# Patient Record
Sex: MALE | Race: WHITE | HISPANIC OR LATINO | Employment: UNEMPLOYED | URBAN - METROPOLITAN AREA
[De-identification: names, ages, dates, MRNs, and addresses within clinical notes are randomized per-mention and may not be internally consistent; named-entity substitution may affect disease eponyms.]

---

## 2024-10-13 ENCOUNTER — APPOINTMENT (OUTPATIENT)
Dept: GENERAL RADIOLOGY | Facility: HOSPITAL | Age: 29
End: 2024-10-13

## 2024-10-13 ENCOUNTER — HOSPITAL ENCOUNTER (EMERGENCY)
Facility: HOSPITAL | Age: 29
Discharge: HOME | End: 2024-10-13
Attending: STUDENT IN AN ORGANIZED HEALTH CARE EDUCATION/TRAINING PROGRAM | Admitting: STUDENT IN AN ORGANIZED HEALTH CARE EDUCATION/TRAINING PROGRAM

## 2024-10-13 VITALS
HEIGHT: 65 IN | WEIGHT: 150 LBS | TEMPERATURE: 98.7 F | RESPIRATION RATE: 18 BRPM | SYSTOLIC BLOOD PRESSURE: 147 MMHG | HEART RATE: 88 BPM | OXYGEN SATURATION: 98 % | BODY MASS INDEX: 24.99 KG/M2 | DIASTOLIC BLOOD PRESSURE: 111 MMHG

## 2024-10-13 DIAGNOSIS — S49.92XA INJURY OF LEFT CLAVICLE, INITIAL ENCOUNTER: ICD-10-CM

## 2024-10-13 DIAGNOSIS — S42.022A CLOSED DISPLACED FRACTURE OF SHAFT OF LEFT CLAVICLE, INITIAL ENCOUNTER: Primary | ICD-10-CM
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
## 2024-10-13 PROCEDURE — 73000 X-RAY EXAM OF COLLAR BONE: CPT

## 2024-10-13 PROCEDURE — 99283 EMERGENCY DEPT VISIT LOW MDM: CPT | Performed by: STUDENT IN AN ORGANIZED HEALTH CARE EDUCATION/TRAINING PROGRAM

## 2024-10-13 PROCEDURE — 71045 X-RAY EXAM CHEST 1 VIEW: CPT

## 2024-10-13 PROCEDURE — 99283 EMERGENCY DEPT VISIT LOW MDM: CPT

## 2024-10-13 RX ORDER — LIDOCAINE 50 MG/G
1 PATCH TOPICAL EVERY 24 HOURS
Qty: 15 PATCH | Refills: 0 | Status: SHIPPED | OUTPATIENT
Start: 2024-10-13 | End: 2024-10-19 | Stop reason: HOSPADM

## 2024-10-13 RX ORDER — SENNOSIDES 8.6 MG
650 CAPSULE ORAL EVERY 8 HOURS PRN
Qty: 30 TABLET | Refills: 0 | Status: SHIPPED | OUTPATIENT
Start: 2024-10-13 | End: 2024-10-19 | Stop reason: HOSPADM

## 2024-10-13 RX ORDER — ACETAMINOPHEN 500 MG
1000 TABLET ORAL ONCE
Status: COMPLETED | OUTPATIENT
Start: 2024-10-13 | End: 2024-10-13

## 2024-10-13 RX ORDER — OXYCODONE HYDROCHLORIDE 5 MG/1
5 TABLET ORAL EVERY 6 HOURS PRN
Qty: 7 TABLET | Refills: 0 | Status: SHIPPED | OUTPATIENT
Start: 2024-10-13 | End: 2024-10-19 | Stop reason: HOSPADM

## 2024-10-13 RX ORDER — LIDOCAINE 4 G/G
1 PATCH TOPICAL ONCE
Status: DISCONTINUED | OUTPATIENT
Start: 2024-10-13 | End: 2024-10-13 | Stop reason: HOSPADM

## 2024-10-13 RX ORDER — IBUPROFEN 600 MG/1
600 TABLET, FILM COATED ORAL EVERY 6 HOURS PRN
Qty: 30 TABLET | Refills: 0 | Status: SHIPPED | OUTPATIENT
Start: 2024-10-13 | End: 2024-10-19 | Stop reason: HOSPADM

## 2024-10-13 RX ORDER — OXYCODONE HYDROCHLORIDE 5 MG/1
5 TABLET ORAL ONCE
Status: COMPLETED | OUTPATIENT
Start: 2024-10-13 | End: 2024-10-13

## 2024-10-13 RX ADMIN — ACETAMINOPHEN 1000 MG: 500 TABLET ORAL at 15:22

## 2024-10-13 RX ADMIN — OXYCODONE HYDROCHLORIDE 5 MG: 5 TABLET ORAL at 15:22

## 2024-10-13 RX ADMIN — LIDOCAINE 1 PATCH: 4 PATCH TOPICAL at 15:24

## 2024-10-13 RX ADMIN — IBUPROFEN 600 MG: 200 TABLET, FILM COATED ORAL at 15:22

## 2024-10-13 NOTE — Clinical Note
JOELLE GAYTAN  [facility] EMERGENCY DEPARTMENT  [address] [place] [address]  Phone: [phone]    Wang Madden was seen and treated in our emergency department on 10/13/2024.  He may return to work on 10/17/2024.  No work until cleared by ortho doctor       Thank you for choosing [facility].    Sandeep William MD
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
## 2024-10-14 ENCOUNTER — OFFICE VISIT (OUTPATIENT)
Dept: ORTHOPEDIC SURGERY | Facility: CLINIC | Age: 29
End: 2024-10-14

## 2024-10-14 VITALS
DIASTOLIC BLOOD PRESSURE: 115 MMHG | HEIGHT: 65 IN | HEART RATE: 69 BPM | WEIGHT: 150 LBS | SYSTOLIC BLOOD PRESSURE: 157 MMHG | BODY MASS INDEX: 24.99 KG/M2

## 2024-10-14 DIAGNOSIS — S42.022A CLOSED DISPLACED FRACTURE OF SHAFT OF LEFT CLAVICLE, INITIAL ENCOUNTER: Primary | ICD-10-CM

## 2024-10-14 PROCEDURE — 99204 OFFICE O/P NEW MOD 45 MIN: CPT | Performed by: ORTHOPAEDIC SURGERY
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
## 2024-10-15 ENCOUNTER — PATIENT ROUNDING (BHMG ONLY) (OUTPATIENT)
Dept: ORTHOPEDIC SURGERY | Facility: CLINIC | Age: 29
End: 2024-10-15

## 2024-10-15 RX ORDER — PREGABALIN 150 MG/1
150 CAPSULE ORAL ONCE
Status: CANCELLED | OUTPATIENT
Start: 2024-10-15 | End: 2024-10-15

## 2024-10-15 RX ORDER — ACETAMINOPHEN 325 MG/1
1000 TABLET ORAL ONCE
Status: CANCELLED | OUTPATIENT
Start: 2024-10-15 | End: 2024-10-15

## 2024-10-15 RX ORDER — MELOXICAM 7.5 MG/1
15 TABLET ORAL ONCE
Status: CANCELLED | OUTPATIENT
Start: 2024-10-15 | End: 2024-10-15

## 2024-10-18 ENCOUNTER — APPOINTMENT (OUTPATIENT)
Dept: GENERAL RADIOLOGY | Facility: HOSPITAL | Age: 29
End: 2024-10-18
Payer: OTHER MISCELLANEOUS

## 2024-10-18 ENCOUNTER — ANESTHESIA EVENT (OUTPATIENT)
Dept: PERIOP | Facility: HOSPITAL | Age: 29
End: 2024-10-18

## 2024-10-18 ENCOUNTER — ANESTHESIA (OUTPATIENT)
Dept: PERIOP | Facility: HOSPITAL | Age: 29
End: 2024-10-18

## 2024-10-18 ENCOUNTER — HOSPITAL ENCOUNTER (OUTPATIENT)
Facility: HOSPITAL | Age: 29
Setting detail: OBSERVATION
Discharge: HOME | End: 2024-10-19
Attending: ORTHOPAEDIC SURGERY | Admitting: ORTHOPAEDIC SURGERY
Payer: OTHER MISCELLANEOUS

## 2024-10-18 DIAGNOSIS — Z98.890 STATUS POST OPEN REDUCTION AND INTERNAL FIXATION (ORIF) OF FRACTURE: Primary | ICD-10-CM

## 2024-10-18 DIAGNOSIS — Z87.81 STATUS POST OPEN REDUCTION AND INTERNAL FIXATION (ORIF) OF FRACTURE: Primary | ICD-10-CM

## 2024-10-18 DIAGNOSIS — S42.022A CLOSED DISPLACED FRACTURE OF SHAFT OF LEFT CLAVICLE, INITIAL ENCOUNTER: ICD-10-CM

## 2024-10-18 LAB
ABO GROUP BLD: NORMAL
APTT PPP: 27.3 SECONDS (ref 24.3–38.1)
BASOPHILS # BLD AUTO: 0.03 10*3/MM3 (ref 0–0.2)
BASOPHILS NFR BLD AUTO: 0.5 % (ref 0–1.5)
BLD GP AB SCN SERPL QL: NEGATIVE
DEPRECATED RDW RBC AUTO: 39.2 FL (ref 37–54)
EOSINOPHIL # BLD AUTO: 0.19 10*3/MM3 (ref 0–0.4)
EOSINOPHIL NFR BLD AUTO: 3 % (ref 0.3–6.2)
ERYTHROCYTE [DISTWIDTH] IN BLOOD BY AUTOMATED COUNT: 12.5 % (ref 12.3–15.4)
HCT VFR BLD AUTO: 45.3 % (ref 37.5–51)
HGB BLD-MCNC: 15.6 G/DL (ref 13–17.7)
IMM GRANULOCYTES # BLD AUTO: 0.01 10*3/MM3 (ref 0–0.05)
IMM GRANULOCYTES NFR BLD AUTO: 0.2 % (ref 0–0.5)
INR PPP: 0.91 (ref 0.9–1.1)
LYMPHOCYTES # BLD AUTO: 1.67 10*3/MM3 (ref 0.7–3.1)
LYMPHOCYTES NFR BLD AUTO: 26.6 % (ref 19.6–45.3)
MCH RBC QN AUTO: 29.5 PG (ref 26.6–33)
MCHC RBC AUTO-ENTMCNC: 34.4 G/DL (ref 31.5–35.7)
MCV RBC AUTO: 85.8 FL (ref 79–97)
MONOCYTES # BLD AUTO: 0.42 10*3/MM3 (ref 0.1–0.9)
MONOCYTES NFR BLD AUTO: 6.7 % (ref 5–12)
NEUTROPHILS NFR BLD AUTO: 3.95 10*3/MM3 (ref 1.7–7)
NEUTROPHILS NFR BLD AUTO: 63 % (ref 42.7–76)
NRBC BLD AUTO-RTO: 0 /100 WBC (ref 0–0.2)
PLATELET # BLD AUTO: 248 10*3/MM3 (ref 140–450)
PMV BLD AUTO: 9.9 FL (ref 6–12)
PROTHROMBIN TIME: 12.6 SECONDS (ref 12.1–15)
RBC # BLD AUTO: 5.28 10*6/MM3 (ref 4.14–5.8)
RH BLD: POSITIVE
T&S EXPIRATION DATE: NORMAL
WBC NRBC COR # BLD AUTO: 6.27 10*3/MM3 (ref 3.4–10.8)

## 2024-10-18 PROCEDURE — 25010000002 LIDOCAINE 2% SOLUTION: Performed by: NURSE ANESTHETIST, CERTIFIED REGISTERED

## 2024-10-18 PROCEDURE — 85730 THROMBOPLASTIN TIME PARTIAL: CPT | Performed by: ORTHOPAEDIC SURGERY

## 2024-10-18 PROCEDURE — 25010000002 CEFAZOLIN PER 500 MG: Performed by: ORTHOPAEDIC SURGERY

## 2024-10-18 PROCEDURE — C1713 ANCHOR/SCREW BN/BN,TIS/BN: HCPCS | Performed by: ORTHOPAEDIC SURGERY

## 2024-10-18 PROCEDURE — 86900 BLOOD TYPING SEROLOGIC ABO: CPT | Performed by: ORTHOPAEDIC SURGERY

## 2024-10-18 PROCEDURE — 86901 BLOOD TYPING SEROLOGIC RH(D): CPT | Performed by: ORTHOPAEDIC SURGERY

## 2024-10-18 PROCEDURE — 25010000002 MIDAZOLAM PER 1MG: Performed by: ANESTHESIOLOGY

## 2024-10-18 PROCEDURE — G0378 HOSPITAL OBSERVATION PER HR: HCPCS

## 2024-10-18 PROCEDURE — 25010000002 PROPOFOL 200 MG/20ML EMULSION: Performed by: NURSE ANESTHETIST, CERTIFIED REGISTERED

## 2024-10-18 PROCEDURE — 94761 N-INVAS EAR/PLS OXIMETRY MLT: CPT

## 2024-10-18 PROCEDURE — 25010000002 BUPIVACAINE (PF) 0.5 % SOLUTION: Performed by: NURSE ANESTHETIST, CERTIFIED REGISTERED

## 2024-10-18 PROCEDURE — 85610 PROTHROMBIN TIME: CPT | Performed by: ORTHOPAEDIC SURGERY

## 2024-10-18 PROCEDURE — 25010000002 SUGAMMADEX 200 MG/2ML SOLUTION: Performed by: NURSE ANESTHETIST, CERTIFIED REGISTERED

## 2024-10-18 PROCEDURE — 23515 OPTX CLAVICULAR FX W/INT FIX: CPT | Performed by: ORTHOPAEDIC SURGERY

## 2024-10-18 PROCEDURE — 25010000002 DEXAMETHASONE PER 1 MG: Performed by: ANESTHESIOLOGY

## 2024-10-18 PROCEDURE — 76000 FLUOROSCOPY <1 HR PHYS/QHP: CPT

## 2024-10-18 PROCEDURE — 25810000003 LACTATED RINGERS PER 1000 ML: Performed by: ANESTHESIOLOGY

## 2024-10-18 PROCEDURE — 23515 OPTX CLAVICULAR FX W/INT FIX: CPT | Performed by: SPECIALIST/TECHNOLOGIST, OTHER

## 2024-10-18 PROCEDURE — 85025 COMPLETE CBC W/AUTO DIFF WBC: CPT | Performed by: ORTHOPAEDIC SURGERY

## 2024-10-18 PROCEDURE — 86850 RBC ANTIBODY SCREEN: CPT | Performed by: ORTHOPAEDIC SURGERY

## 2024-10-18 PROCEDURE — 25010000002 ONDANSETRON PER 1 MG: Performed by: ANESTHESIOLOGY

## 2024-10-18 PROCEDURE — 73000 X-RAY EXAM OF COLLAR BONE: CPT

## 2024-10-18 DEVICE — SCRW LK S/TAP T8STRDRV 2.7X14MM: Type: IMPLANTABLE DEVICE | Site: ARM | Status: FUNCTIONAL

## 2024-10-18 DEVICE — KNOTLESS TISSUE CONTROL DEVICE, UNDYED UNIDIRECTIONAL (ANTIBACTERIAL) SYNTHETIC ABSORBABLE DEVICE
Type: IMPLANTABLE DEVICE | Site: ARM | Status: FUNCTIONAL
Brand: STRATAFIX

## 2024-10-18 DEVICE — MEDLINE BONEWAX YELLOW
Type: IMPLANTABLE DEVICE | Site: ARM | Status: FUNCTIONAL
Brand: MEDLINE BONEWAX YELLOW

## 2024-10-18 DEVICE — SCRW CORT S/TAP STRDRV 2.7X14MM: Type: IMPLANTABLE DEVICE | Site: ARM | Status: FUNCTIONAL

## 2024-10-18 DEVICE — OPTIUM DBM PUTTY
Type: IMPLANTABLE DEVICE | Site: ARM | Status: FUNCTIONAL
Brand: OPTIUM®

## 2024-10-18 DEVICE — SCRW LK S/TAP T8STRDRV 2.7X12MM: Type: IMPLANTABLE DEVICE | Site: ARM | Status: FUNCTIONAL

## 2024-10-18 DEVICE — SCRW CORT S/TAP STRDRV 2.7X12MM: Type: IMPLANTABLE DEVICE | Site: ARM | Status: FUNCTIONAL

## 2024-10-18 DEVICE — SCRW LK S/TAP T8STRDRV 2.7X16MM: Type: IMPLANTABLE DEVICE | Site: ARM | Status: FUNCTIONAL

## 2024-10-18 DEVICE — IMPLANTABLE DEVICE: Type: IMPLANTABLE DEVICE | Site: ARM | Status: FUNCTIONAL

## 2024-10-18 RX ORDER — HYDROCODONE BITARTRATE AND ACETAMINOPHEN 5; 325 MG/1; MG/1
1 TABLET ORAL ONCE AS NEEDED
Status: DISCONTINUED | OUTPATIENT
Start: 2024-10-18 | End: 2024-10-18 | Stop reason: HOSPADM

## 2024-10-18 RX ORDER — NALOXONE HCL 0.4 MG/ML
0.4 VIAL (ML) INJECTION
Status: DISCONTINUED | OUTPATIENT
Start: 2024-10-18 | End: 2024-10-19 | Stop reason: HOSPADM

## 2024-10-18 RX ORDER — TRANEXAMIC ACID 10 MG/ML
1000 INJECTION, SOLUTION INTRAVENOUS ONCE
Status: DISCONTINUED | OUTPATIENT
Start: 2024-10-18 | End: 2024-10-18 | Stop reason: HOSPADM

## 2024-10-18 RX ORDER — MELOXICAM 7.5 MG/1
15 TABLET ORAL ONCE
Status: DISCONTINUED | OUTPATIENT
Start: 2024-10-18 | End: 2024-10-18 | Stop reason: SDUPTHER

## 2024-10-18 RX ORDER — BUPIVACAINE HYDROCHLORIDE 5 MG/ML
INJECTION, SOLUTION EPIDURAL; INTRACAUDAL
Status: COMPLETED | OUTPATIENT
Start: 2024-10-18 | End: 2024-10-18

## 2024-10-18 RX ORDER — DEXMEDETOMIDINE HYDROCHLORIDE 100 UG/ML
INJECTION, SOLUTION INTRAVENOUS
Status: COMPLETED | OUTPATIENT
Start: 2024-10-18 | End: 2024-10-18

## 2024-10-18 RX ORDER — PREGABALIN 75 MG/1
150 CAPSULE ORAL ONCE
Status: COMPLETED | OUTPATIENT
Start: 2024-10-18 | End: 2024-10-18

## 2024-10-18 RX ORDER — TRANEXAMIC ACID 10 MG/ML
1000 INJECTION, SOLUTION INTRAVENOUS ONCE
Status: COMPLETED | OUTPATIENT
Start: 2024-10-18 | End: 2024-10-18

## 2024-10-18 RX ORDER — ONDANSETRON 2 MG/ML
4 INJECTION INTRAMUSCULAR; INTRAVENOUS EVERY 6 HOURS PRN
Status: DISCONTINUED | OUTPATIENT
Start: 2024-10-18 | End: 2024-10-19 | Stop reason: HOSPADM

## 2024-10-18 RX ORDER — SODIUM CHLORIDE, SODIUM LACTATE, POTASSIUM CHLORIDE, CALCIUM CHLORIDE 600; 310; 30; 20 MG/100ML; MG/100ML; MG/100ML; MG/100ML
9 INJECTION, SOLUTION INTRAVENOUS CONTINUOUS
Status: DISCONTINUED | OUTPATIENT
Start: 2024-10-18 | End: 2024-10-18

## 2024-10-18 RX ORDER — MELOXICAM 7.5 MG/1
15 TABLET ORAL ONCE
Status: COMPLETED | OUTPATIENT
Start: 2024-10-18 | End: 2024-10-18

## 2024-10-18 RX ORDER — FENTANYL CITRATE 50 UG/ML
50 INJECTION, SOLUTION INTRAMUSCULAR; INTRAVENOUS
Status: DISCONTINUED | OUTPATIENT
Start: 2024-10-18 | End: 2024-10-18 | Stop reason: HOSPADM

## 2024-10-18 RX ORDER — DEXAMETHASONE SODIUM PHOSPHATE 10 MG/ML
8 INJECTION INTRAMUSCULAR; INTRAVENOUS ONCE AS NEEDED
Status: COMPLETED | OUTPATIENT
Start: 2024-10-18 | End: 2024-10-18

## 2024-10-18 RX ORDER — SODIUM CHLORIDE 0.9 % (FLUSH) 0.9 %
10 SYRINGE (ML) INJECTION AS NEEDED
Status: DISCONTINUED | OUTPATIENT
Start: 2024-10-18 | End: 2024-10-19 | Stop reason: HOSPADM

## 2024-10-18 RX ORDER — OXYCODONE AND ACETAMINOPHEN 10; 325 MG/1; MG/1
1 TABLET ORAL EVERY 4 HOURS PRN
Status: DISCONTINUED | OUTPATIENT
Start: 2024-10-18 | End: 2024-10-19 | Stop reason: HOSPADM

## 2024-10-18 RX ORDER — PREGABALIN 75 MG/1
150 CAPSULE ORAL ONCE
Status: DISCONTINUED | OUTPATIENT
Start: 2024-10-18 | End: 2024-10-18 | Stop reason: SDUPTHER

## 2024-10-18 RX ORDER — SODIUM CHLORIDE 9 MG/ML
40 INJECTION, SOLUTION INTRAVENOUS AS NEEDED
Status: ACTIVE | OUTPATIENT
Start: 2024-10-18 | End: 2024-10-18

## 2024-10-18 RX ORDER — ONDANSETRON 2 MG/ML
4 INJECTION INTRAMUSCULAR; INTRAVENOUS ONCE AS NEEDED
Status: DISCONTINUED | OUTPATIENT
Start: 2024-10-18 | End: 2024-10-18 | Stop reason: HOSPADM

## 2024-10-18 RX ORDER — ACETAMINOPHEN 500 MG
1000 TABLET ORAL ONCE
Status: DISCONTINUED | OUTPATIENT
Start: 2024-10-18 | End: 2024-10-18 | Stop reason: SDUPTHER

## 2024-10-18 RX ORDER — SODIUM CHLORIDE, SODIUM LACTATE, POTASSIUM CHLORIDE, CALCIUM CHLORIDE 600; 310; 30; 20 MG/100ML; MG/100ML; MG/100ML; MG/100ML
100 INJECTION, SOLUTION INTRAVENOUS ONCE
Status: DISCONTINUED | OUTPATIENT
Start: 2024-10-18 | End: 2024-10-18 | Stop reason: HOSPADM

## 2024-10-18 RX ORDER — ACETAMINOPHEN 500 MG
1000 TABLET ORAL ONCE
Status: COMPLETED | OUTPATIENT
Start: 2024-10-18 | End: 2024-10-18

## 2024-10-18 RX ORDER — MORPHINE SULFATE 2 MG/ML
1 INJECTION, SOLUTION INTRAMUSCULAR; INTRAVENOUS EVERY 4 HOURS PRN
Status: DISCONTINUED | OUTPATIENT
Start: 2024-10-18 | End: 2024-10-19 | Stop reason: HOSPADM

## 2024-10-18 RX ORDER — MIDAZOLAM HYDROCHLORIDE 2 MG/2ML
1 INJECTION, SOLUTION INTRAMUSCULAR; INTRAVENOUS
Status: DISCONTINUED | OUTPATIENT
Start: 2024-10-18 | End: 2024-10-18 | Stop reason: HOSPADM

## 2024-10-18 RX ORDER — SODIUM CHLORIDE 0.9 % (FLUSH) 0.9 %
10 SYRINGE (ML) INJECTION EVERY 12 HOURS SCHEDULED
Status: DISCONTINUED | OUTPATIENT
Start: 2024-10-18 | End: 2024-10-19 | Stop reason: HOSPADM

## 2024-10-18 RX ORDER — LIDOCAINE HYDROCHLORIDE 20 MG/ML
INJECTION, SOLUTION INFILTRATION; PERINEURAL AS NEEDED
Status: DISCONTINUED | OUTPATIENT
Start: 2024-10-18 | End: 2024-10-18 | Stop reason: SURG

## 2024-10-18 RX ORDER — MAGNESIUM HYDROXIDE 1200 MG/15ML
LIQUID ORAL AS NEEDED
Status: DISCONTINUED | OUTPATIENT
Start: 2024-10-18 | End: 2024-10-18 | Stop reason: HOSPADM

## 2024-10-18 RX ORDER — ONDANSETRON 4 MG/1
4 TABLET, ORALLY DISINTEGRATING ORAL EVERY 6 HOURS PRN
Status: DISCONTINUED | OUTPATIENT
Start: 2024-10-18 | End: 2024-10-19 | Stop reason: HOSPADM

## 2024-10-18 RX ORDER — PROPOFOL 10 MG/ML
INJECTION, EMULSION INTRAVENOUS AS NEEDED
Status: DISCONTINUED | OUTPATIENT
Start: 2024-10-18 | End: 2024-10-18 | Stop reason: SURG

## 2024-10-18 RX ORDER — ROCURONIUM BROMIDE 10 MG/ML
INJECTION, SOLUTION INTRAVENOUS AS NEEDED
Status: DISCONTINUED | OUTPATIENT
Start: 2024-10-18 | End: 2024-10-18 | Stop reason: SURG

## 2024-10-18 RX ORDER — OXYCODONE AND ACETAMINOPHEN 5; 325 MG/1; MG/1
1 TABLET ORAL EVERY 4 HOURS PRN
Status: DISCONTINUED | OUTPATIENT
Start: 2024-10-18 | End: 2024-10-19 | Stop reason: HOSPADM

## 2024-10-18 RX ORDER — ONDANSETRON 2 MG/ML
4 INJECTION INTRAMUSCULAR; INTRAVENOUS ONCE AS NEEDED
Status: COMPLETED | OUTPATIENT
Start: 2024-10-18 | End: 2024-10-18

## 2024-10-18 RX ORDER — KETAMINE HYDROCHLORIDE 10 MG/ML
INJECTION, SOLUTION INTRAMUSCULAR; INTRAVENOUS AS NEEDED
Status: DISCONTINUED | OUTPATIENT
Start: 2024-10-18 | End: 2024-10-18 | Stop reason: SURG

## 2024-10-18 RX ORDER — FAMOTIDINE 10 MG/ML
20 INJECTION, SOLUTION INTRAVENOUS
Status: COMPLETED | OUTPATIENT
Start: 2024-10-18 | End: 2024-10-18

## 2024-10-18 RX ADMIN — PROPOFOL 150 MG: 10 INJECTION, EMULSION INTRAVENOUS at 15:00

## 2024-10-18 RX ADMIN — KETAMINE HYDROCHLORIDE 30 MG: 10 INJECTION INTRAMUSCULAR; INTRAVENOUS at 15:00

## 2024-10-18 RX ADMIN — ROCURONIUM 10 MG: 50 INJECTION, SOLUTION INTRAVENOUS at 16:21

## 2024-10-18 RX ADMIN — BUPIVACAINE HYDROCHLORIDE 15 ML: 5 INJECTION, SOLUTION EPIDURAL; INTRACAUDAL; PERINEURAL at 14:25

## 2024-10-18 RX ADMIN — SODIUM CHLORIDE, POTASSIUM CHLORIDE, SODIUM LACTATE AND CALCIUM CHLORIDE: 600; 310; 30; 20 INJECTION, SOLUTION INTRAVENOUS at 14:49

## 2024-10-18 RX ADMIN — TRANEXAMIC ACID 1000 MG: 10 INJECTION, SOLUTION INTRAVENOUS at 15:11

## 2024-10-18 RX ADMIN — ACETAMINOPHEN 1000 MG: 500 TABLET ORAL at 14:02

## 2024-10-18 RX ADMIN — DEXMEDETOMIDINE 15 MCG: 100 INJECTION, SOLUTION, CONCENTRATE INTRAVENOUS at 14:25

## 2024-10-18 RX ADMIN — ROCURONIUM 50 MG: 50 INJECTION, SOLUTION INTRAVENOUS at 15:01

## 2024-10-18 RX ADMIN — LIDOCAINE HYDROCHLORIDE 80 MG: 20 INJECTION, SOLUTION INFILTRATION; PERINEURAL at 15:00

## 2024-10-18 RX ADMIN — DEXAMETHASONE SODIUM PHOSPHATE 8 MG: 10 INJECTION INTRAMUSCULAR; INTRAVENOUS at 14:02

## 2024-10-18 RX ADMIN — CEFAZOLIN 2000 MG: 2 INJECTION, POWDER, FOR SOLUTION INTRAVENOUS at 15:09

## 2024-10-18 RX ADMIN — SUGAMMADEX 148 MG: 100 INJECTION, SOLUTION INTRAVENOUS at 17:07

## 2024-10-18 RX ADMIN — BUPIVACAINE HYDROCHLORIDE 5 ML: 5 INJECTION, SOLUTION EPIDURAL; INTRACAUDAL at 14:31

## 2024-10-18 RX ADMIN — ROCURONIUM 10 MG: 50 INJECTION, SOLUTION INTRAVENOUS at 15:39

## 2024-10-18 RX ADMIN — PREGABALIN 150 MG: 75 CAPSULE ORAL at 14:02

## 2024-10-18 RX ADMIN — Medication 10 ML: at 21:29

## 2024-10-18 RX ADMIN — FAMOTIDINE 20 MG: 10 INJECTION, SOLUTION INTRAVENOUS at 14:01

## 2024-10-18 RX ADMIN — OXYCODONE HYDROCHLORIDE AND ACETAMINOPHEN 1 TABLET: 5; 325 TABLET ORAL at 21:30

## 2024-10-18 RX ADMIN — DEXMEDETOMIDINE HYDROCHLORIDE 5 MCG: 100 INJECTION, SOLUTION INTRAVENOUS at 14:31

## 2024-10-18 RX ADMIN — MIDAZOLAM HYDROCHLORIDE 1 MG: 1 INJECTION, SOLUTION INTRAMUSCULAR; INTRAVENOUS at 14:05

## 2024-10-18 RX ADMIN — TRANEXAMIC ACID 1000 MG: 10 INJECTION, SOLUTION INTRAVENOUS at 17:00

## 2024-10-18 RX ADMIN — ONDANSETRON 4 MG: 2 INJECTION INTRAMUSCULAR; INTRAVENOUS at 14:01

## 2024-10-18 RX ADMIN — MELOXICAM 15 MG: 7.5 TABLET ORAL at 14:02

## 2024-10-18 NOTE — OP NOTE
Date of Operation: 10/18/2024     PREOPERATIVE DIAGNOSIS: Left comminuted clavicle fracture.       POSTOPERATIVE DIAGNOSIS: Left comminuted clavicle fracture.        PROCEDURE PERFORMED: Open reduction, internal fixation of left clavicle fracture.       SURGEON: Luis Gaines MD     ASSISTANT:     Assistant: Liam Eddy CSA was responsible for performing the following activities: Retraction, Irrigation, Closing, and Placing Dressing and their skilled assistance was necessary for the success of this case.     ANESTHESIA: general with block       ESTIMATED BLOOD LOSS: 50 mL       URINE OUTPUT: Not recorded.       FLUIDS: Per anesthesia.       COMPLICATIONS: None.       SPECIMENS: None.       DRAINS: None.       IMPLANTS: Synthes superior left clavicle shaft plate with locking screw x 7, cortical screw x 3 with 1 mL of demineralized bone matrix.       INDICATIONS FOR PROCEDURE: The patient is a pleasant 29 y.o. male with significant history of left clavicle fracture. I discussed treatment options available to the patient including closed treatment versus open reduction, internal fixation, and patient wished to proceed with surgical treatment. Given the comminuted nature of the fracture site, as well as concerns for nonunion. I explained details of the procedure, as well as the risks, benefits, and alternatives as documented on history and physical, and the patient had all questions answered prior to signing the operative consent form. No guarantees were given in regard to results of the surgery.       DESCRIPTION OF PROCEDURE: The patient was seen, evaluated, and cleared for surgery by anesthesia. Admitted in the preoperative holding area. The operative site was marked, consent was reviewed, history and physical was updated, and preoperative labs were reviewed. A regional block was then placed per anesthesia. The patient was then taken to the operating room and placed in a supine position on a beach chair table.  After successful intubation per anesthesia, the patient was elevated up into a semi-seated position. Head was secured to table with a facemask. All bony prominences were well padded. The neck was in normal anatomic alignment. The left upper extremity was then sterilely prepped and draped in a standard fashion.       A formal timeout was completed, including confirmation of History and Physical, operative consent, surgical site, patient identification number, and preoperative antibiotic administration. The procedure was then begun with an 8 cm incision over the anterior aspect of the left shoulder just inferior to the clavicular margin through the skin with a #15 blade. Careful dissection was carried through the subcutaneous tissue with Metzenbaum scissors until the periosteal layer of the clavicle was identified at this point in time.  Fracture site was identified at this point time and tentative reduction obtained. A superior shaft plate was chosen and applied to the superior surface of the clavicle at this time. One cortical screw was placed in bicortical fashion medial to the fracture site. The plate was clamped to the lateral clavicle, reduction and length of clavicle were assessed and noted to be acceptable at this time. Thus, cortical screws were placed lateral to the fracture site in bicortical fashion as well. Additional locking screws were placed both medial and lateral to the fracture site at this point in time.  Reduction and stability at the fracture site were once again assessed under direct visualization as well as under stress with gentle range of motion of shoulder. Final fluoroscopic images were taken at this point in time, confirming acceptable reduction and placement of hardware.      Attention was then turned to thorough irrigation with Betadine lavage followed by normal saline.  DBM graft was applied to the comminuted section of the fracture at this time followed by replacement of one of the  comminuted pieces that was too small for screw fixation.  This was then sutured into place with 2-0 Vicryl x 2.  Good fixation noted.     Attention was then turned to closure of the wound with 2-0 Vicryl for subcutaneous closure, 3-0 effects for skin closure with mesh and glue for skin glue. The wounds were dressed with Telfa, 4 x 4 gauze, Tegaderm, ABD pad, and Medipore tape. The patient was placed in a sling with an abduction pillow to left side.       At the end of the procedure, all lap, needle, and sponge counts were correct x2. The patient had brisk capillary refill to all digits of the left upper extremity. Compartments were soft and easily compressible at the end of the procedure.       DISPOSITION: The patient was extubated per anesthesia and taken to the recovery room in stable condition. Patient will be discharged home in the care of family, follow up in 1 week for wound check. Continue in the sling at all times until followup. All questions were answered postoperatively.

## 2024-10-18 NOTE — ANESTHESIA POSTPROCEDURE EVALUATION
Patient: Wang Madden    Procedure Summary       Date: 10/18/24 Room / Location:  [facility] OR 3 /  [facility] OR    Anesthesia Start: 1452 Anesthesia Stop: 1717    Procedure: Open reduction internal fixation clavicle fracture and all associated procedures (Left: Arm Upper) Diagnosis:       Closed displaced fracture of shaft of left clavicle, initial encounter      (Closed displaced fracture of shaft of left clavicle, initial encounter [S42.022A])    Surgeons: Luis Gaines MD Provider: Tabitha De Oliveira CRNA    Anesthesia Type: general with block ASA Status: 1            Anesthesia Type: general with block    Vitals  Vitals Value Taken Time   /85 10/18/24 1805   Temp 97.6 °F (36.4 °C) 10/18/24 1740   Pulse 89 10/18/24 1812   Resp 18 10/18/24 1750   SpO2 96 % 10/18/24 1812   Vitals shown include unfiled device data.        Post Anesthesia Care and Evaluation    Patient location during evaluation: PACU  Patient participation: complete - patient participated  Level of consciousness: awake and alert  Pain score: 2  Pain management: satisfactory to patient    Airway patency: patent  Anesthetic complications: No anesthetic complications  PONV Status: none  Cardiovascular status: acceptable  Respiratory status: acceptable  Hydration status: acceptable

## 2024-10-18 NOTE — ANESTHESIA PROCEDURE NOTES
Peripheral Block    Pre-sedation assessment completed: 10/18/2024 2:07 PM    Patient reassessed immediately prior to procedure    Patient location during procedure: pre-op  Start time: 10/18/2024 2:29 PM  Stop time: 10/18/2024 2:31 PM  Reason for block: at surgeon's request and post-op pain management  Performed by  CRNA/CAA: Tabitha De Oliveira CRNASRNA: Gregory Thorpe SRNA  Preanesthetic Checklist  Completed: patient identified, IV checked, site marked, risks and benefits discussed, surgical consent, monitors and equipment checked, pre-op evaluation and timeout performed  Prep:  Pt Position: supine  Sterile barriers:cap, gloves, mask and washed/disinfected hands  Prep: ChloraPrep  Patient monitoring: blood pressure monitoring, continuous pulse oximetry and EKG  Procedure    Sedation: yes  Performed under: local infiltration  Guidance:ultrasound guided    ULTRASOUND INTERPRETATION.  Using ultrasound guidance a 21 G gauge needle was placed in close proximity to the nerve, at which point, under ultrasound guidance anesthetic was injected in the area of the nerve and spread of the anesthesia was seen on ultrasound in close proximity thereto.  There were no abnormalities seen on ultrasound; a digital image was taken; and the patient tolerated the procedure with no complications. Images:still images obtained, printed/placed on chart    Laterality:left  Block Type:superficial cervical plexus  Injection Technique:single-shot  Needle Type:echogenic  Needle Gauge:21 G  Resistance on Injection: none    Medications Used: dexmedetomidine HCl (PRECEDEX) injection - Perineural   5 mcg - 10/18/2024 2:31:00 PM  bupivacaine PF (MARCAINE) injection 0.5% - Perineural   5 mL - 10/18/2024 2:31:00 PM      Post Assessment  Injection Assessment: negative aspiration for heme, no paresthesia on injection and incremental injection  Patient Tolerance:comfortable throughout block  Complications:no  Performed by: Gregory Thorpe,  SRNA

## 2024-10-18 NOTE — ANESTHESIA PREPROCEDURE EVALUATION
Anesthesia Evaluation     Patient summary reviewed and Nursing notes reviewed   no history of anesthetic complications:   NPO Solid Status: > 8 hours  NPO Liquid Status: > 8 hours           Airway   Mallampati: II  TM distance: >3 FB  Neck ROM: full  No difficulty expected  Dental - normal exam     Pulmonary - negative pulmonary ROS and normal exam   Cardiovascular - negative cardio ROS and normal exam  Exercise tolerance: good (4-7 METS)        Neuro/Psych  GI/Hepatic/Renal/Endo - negative ROS     Musculoskeletal (-) negative ROS    Abdominal  - normal exam   Substance History - negative use     OB/GYN          Other - negative ROS       ROS/Med Hx Other:  used for entire pre-operative assessment including education on procedure, risks/benefits, etc.  also used during block placement.                  Anesthesia Plan    ASA 1     general with block     (Consents to ISB and superficial cervical plexus block (via  used). )  intravenous induction     Anesthetic plan, risks, benefits, and alternatives have been provided, discussed and informed consent has been obtained with: patient.  Pre-procedure education provided  Use of blood products discussed with patient  Consented to blood products.    Plan discussed with CRNA.      CODE STATUS:
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
## 2024-10-18 NOTE — CASE MANAGEMENT/SOCIAL WORK
Continued Stay Note  REJI Cortes     Patient Name: Wang Madden  MRN: [id number]  Today's Date: 10/18/2024    Admit Date: 10/18/2024    Plan: Home with his sister.   Discharge Plan       Row Name 10/18/24 0944       Plan    Plan Home with his sister.    Plan Comments Met with pt and his sister, pt agreed to talk in front of his sister.  Used [employer] intrepreter # [id number]. He confirmed his address. pt lives with is sister in a 1 story home. there are 3-4 steps to get into the front door.  He is IADL's and uses no DME.  He works full time and already has a work excuse.  He says his sister can manage finances until he can use his left arm again.  He uses [email or web address] in [place] and has no insurance.  I gave him a Good Rx card to help.  He also has no pcp.  I gave him information on [facility] clinic here in [place].  His plan is to return home with his sister.  I sent referral to [facility] to see if he qualifies for medicaid. Will follow. Thanks, Melisa SANCHEZ                   Discharge Codes    No documentation.                       Melisa Beltran

## 2024-10-18 NOTE — ANESTHESIA PROCEDURE NOTES
Peripheral Block    Pre-sedation assessment completed: 10/18/2024 2:07 PM    Patient reassessed immediately prior to procedure    Patient location during procedure: pre-op  Start time: 10/18/2024 2:18 PM  Stop time: 10/18/2024 2:25 PM  Reason for block: at surgeon's request and post-op pain management  Performed by  CRNA/CAA: Tabitha De Oliveira CRNASRNA: Gregory Thorpe SRNA  Preanesthetic Checklist  Completed: patient identified, IV checked, site marked, risks and benefits discussed, surgical consent, monitors and equipment checked, pre-op evaluation and timeout performed  Prep:  Pt Position: supine  Sterile barriers:cap, gloves, mask and washed/disinfected hands  Prep: ChloraPrep  Patient monitoring: blood pressure monitoring, continuous pulse oximetry and EKG  Procedure    Sedation: yes  Performed under: local infiltration  Guidance:ultrasound guided    ULTRASOUND INTERPRETATION.  Using ultrasound guidance a 21 G gauge needle was placed in close proximity to the nerve, at which point, under ultrasound guidance anesthetic was injected in the area of the nerve and spread of the anesthesia was seen on ultrasound in close proximity thereto.  There were no abnormalities seen on ultrasound; a digital image was taken; and the patient tolerated the procedure with no complications. Images:still images obtained, printed/placed on chart    Laterality:left  Block Type:interscalene  Injection Technique:single-shot  Needle Type:echogenic  Needle Gauge:21 G  Resistance on Injection: none    Medications Used: bupivacaine PF (MARCAINE) injection 0.5% - Perineural   15 mL - 10/18/2024 2:25:00 PM  dexmedetomidine HCl (PRECEDEX) injection - Perineural   15 mcg - 10/18/2024 2:25:00 PM      Post Assessment  Injection Assessment: negative aspiration for heme, no paresthesia on injection and incremental injection  Patient Tolerance:comfortable throughout block  Complications:no  Performed by: Gregory Thorpe, SRNA

## 2024-10-18 NOTE — ANESTHESIA PROCEDURE NOTES
Airway  Urgency: elective    Date/Time: 10/18/2024 3:04 PM  Airway not difficult    General Information and Staff    Patient location during procedure: OR  CRNA/CAA: Tabitha De Oliveira CRNA    Indications and Patient Condition  Indications for airway management: airway protection    Preoxygenated: yes  Mask difficulty assessment: 1 - vent by mask    Final Airway Details  Final airway type: endotracheal airway      Successful airway: ETT  Cuffed: yes   Successful intubation technique: direct laryngoscopy  Facilitating devices/methods: intubating stylet  Endotracheal tube insertion site: oral  Blade: Princess  Blade size: 3.5  ETT size (mm): 7.0  Cormack-Lehane Classification: grade I - full view of glottis  Placement verified by: chest auscultation and capnometry   Cuff volume (mL): 8  Measured from: lips  ETT/EBT  to lips (cm): 21  Number of attempts at approach: 1  Assessment: lips, teeth, and gum same as pre-op and atraumatic intubation

## 2024-10-19 VITALS
TEMPERATURE: 97.9 F | HEIGHT: 65 IN | WEIGHT: 163 LBS | OXYGEN SATURATION: 94 % | RESPIRATION RATE: 20 BRPM | BODY MASS INDEX: 27.16 KG/M2 | DIASTOLIC BLOOD PRESSURE: 77 MMHG | SYSTOLIC BLOOD PRESSURE: 136 MMHG | HEART RATE: 84 BPM

## 2024-10-19 LAB
ANION GAP SERPL CALCULATED.3IONS-SCNC: 12.9 MMOL/L (ref 5–15)
BUN SERPL-MCNC: 19 MG/DL (ref 6–20)
BUN/CREAT SERPL: 20.9 (ref 7–25)
CALCIUM SPEC-SCNC: 9.1 MG/DL (ref 8.6–10.5)
CHLORIDE SERPL-SCNC: 103 MMOL/L (ref 98–107)
CO2 SERPL-SCNC: 20.1 MMOL/L (ref 22–29)
CREAT SERPL-MCNC: 0.91 MG/DL (ref 0.76–1.27)
EGFRCR SERPLBLD CKD-EPI 2021: 116.3 ML/MIN/1.73
GLUCOSE SERPL-MCNC: 166 MG/DL (ref 65–99)
HCT VFR BLD AUTO: 45.7 % (ref 37.5–51)
HGB BLD-MCNC: 15.7 G/DL (ref 13–17.7)
POTASSIUM SERPL-SCNC: 4 MMOL/L (ref 3.5–5.2)
SODIUM SERPL-SCNC: 136 MMOL/L (ref 136–145)

## 2024-10-19 PROCEDURE — 80048 BASIC METABOLIC PNL TOTAL CA: CPT | Performed by: ORTHOPAEDIC SURGERY

## 2024-10-19 PROCEDURE — G0378 HOSPITAL OBSERVATION PER HR: HCPCS

## 2024-10-19 PROCEDURE — 97161 PT EVAL LOW COMPLEX 20 MIN: CPT

## 2024-10-19 PROCEDURE — 85018 HEMOGLOBIN: CPT | Performed by: ORTHOPAEDIC SURGERY

## 2024-10-19 PROCEDURE — 97165 OT EVAL LOW COMPLEX 30 MIN: CPT

## 2024-10-19 PROCEDURE — 94799 UNLISTED PULMONARY SVC/PX: CPT

## 2024-10-19 PROCEDURE — 25010000002 CEFAZOLIN PER 500 MG: Performed by: ORTHOPAEDIC SURGERY

## 2024-10-19 PROCEDURE — 85014 HEMATOCRIT: CPT | Performed by: ORTHOPAEDIC SURGERY

## 2024-10-19 RX ORDER — ONDANSETRON 4 MG/1
4 TABLET, ORALLY DISINTEGRATING ORAL EVERY 8 HOURS PRN
Qty: 20 TABLET | Refills: 0 | Status: SHIPPED | OUTPATIENT
Start: 2024-10-19

## 2024-10-19 RX ORDER — OXYCODONE AND ACETAMINOPHEN 7.5; 325 MG/1; MG/1
1 TABLET ORAL EVERY 4 HOURS PRN
Qty: 30 TABLET | Refills: 0 | Status: SHIPPED | OUTPATIENT
Start: 2024-10-19

## 2024-10-19 RX ORDER — DOCUSATE SODIUM 100 MG/1
100 CAPSULE, LIQUID FILLED ORAL 2 TIMES DAILY PRN
Qty: 60 CAPSULE | Refills: 0 | Status: SHIPPED | OUTPATIENT
Start: 2024-10-19

## 2024-10-19 RX ADMIN — CEFAZOLIN 2000 MG: 2 INJECTION, POWDER, FOR SOLUTION INTRAVENOUS at 00:00

## 2024-10-19 RX ADMIN — CEFAZOLIN 2000 MG: 2 INJECTION, POWDER, FOR SOLUTION INTRAVENOUS at 06:25

## 2024-10-19 NOTE — PROGRESS NOTES
POD# 1 s/p ORIF left clavicle fracture    Subjective:Wang Madden resting in bed this a.m.,  utilized this a.m.  He reports overnight he is done well does continue to experience some paresthesia into the hand including palmar and dorsum of the hand and all digits.  Pain controlled overnight.  Denies any issues with his incision overnight.      Objective:  Vitals:    10/18/24 2030 10/18/24 2354 10/19/24 0404 10/19/24 0732   BP:  127/63 116/67 136/77   BP Location:  Right arm Right arm Right arm   Patient Position:  Lying Lying Lying   Pulse: 97 109 92 89   Resp: 20 18 18 18   Temp:  97 °F (36.1 °C) 97 °F (36.1 °C) 97.9 °F (36.6 °C)   TempSrc:  Temporal Temporal Tympanic   SpO2: 93% 94% 95% 94%   Weight:       Height:           Results from last 7 days   Lab Units 10/19/24  0450 10/18/24  0800   WBC 10*3/mm3  --  6.27   HEMOGLOBIN g/dL 15.7 15.6   HEMATOCRIT % 45.7 45.3   PLATELETS 10*3/mm3  --  248       Results from last 7 days   Lab Units 10/19/24  0450   SODIUM mmol/L 136   POTASSIUM mmol/L 4.0   CHLORIDE mmol/L 103   CO2 mmol/L 20.1*   BUN mg/dL 19   CREATININE mg/dL 0.91   GLUCOSE mg/dL 166*   CALCIUM mg/dL 9.1       Exam:    Left upper extremity examined  Dressing clean dry and intact  Flex/extend all digits left hand  Positive sensation along the palmar and dorsum of the hand including all digits compared to contralateral side  Brisk cap refill all digits left hand  2+ distal radius pulse  Positive deltoid firing    Impression: s/p ORIF left clavicle fracture    Plan:  1. PT/OT-sling left upper extremity, ambulate continue with finger motion, elbow motion twice daily  2. Pain control-Percocet  3. DVT prophylaxis-ambulate  4. Wound care-dressing remain intact until follow-up in office Home today  5. Disposition-Home today once cleared by PT/OT follow-up in Ortho office in 1 week

## 2024-10-19 NOTE — THERAPY DISCHARGE NOTE
Patient Name: Wang Madden  : 1995    MRN: [id number]                              Today's Date: 10/19/2024       Admit Date: 10/18/2024    Visit Dx:     ICD-10-CM ICD-9-CM   1. Status post open reduction and internal fixation (ORIF) of fracture  Z98.890 V45.89    Z87.81 V15.51   2. Closed displaced fracture of shaft of left clavicle, initial encounter  S42.022A 810.02     Patient Active Problem List   Diagnosis    Displaced fracture of shaft of left clavicle, initial encounter for closed fracture    Closed displaced fracture of shaft of left clavicle     History reviewed. No pertinent past medical history.  History reviewed. No pertinent surgical history.   General Information       Row Name 10/19/24 0905          Physical Therapy Time and Intention    Document Type discharge evaluation/summary  -BP     Mode of Treatment physical therapy  -BP       Row Name 10/19/24 0905          General Information    Patient Profile Reviewed yes  Patient admitted s/p L clavical fracture, now s/p ORIF. Malay speaking, interterpreter video utilized.  -BP     Prior Level of Function independent:  -BP     Existing Precautions/Restrictions no known precautions/restrictions  -BP     Barriers to Rehab none identified  -BP       Row Name 10/19/24 0905          Cognition    Orientation Status (Cognition) --  did not formerly assessed, video  used.  -BP       Row Name 10/19/24 0905          Safety Issues/Impairments Affecting Functional Mobility    Comment, Safety Issues/Impairments (Mobility) WFL  -BP               User Key  (r) = Recorded By, (t) = Taken By, (c) = Cosigned By      Initials Name Provider Type    Maxwell Lopes, PT Physical Therapist                   Mobility       Row Name 10/19/24 0908          Bed Mobility    Bed Mobility supine-sit  -BP     Supine-Sit [place] (Bed Mobility) modified independence  -BP     Assistive Device (Bed Mobility) head of bed elevated  -BP       Row Name  10/19/24 0908          Sit-Stand Transfer    Sit-Stand [place] (Transfers) independent  -BP       Row Name 10/19/24 0908          Gait/Stairs (Locomotion)    [place] Level (Gait) independent  -BP     Distance in Feet (Gait) 50  -BP     Comment, (Gait/Stairs) no device, good gait speed and mechanics, no loss of balance.  -BP       Row Name 10/19/24 0908          Mobility    Extremity Weight-bearing Status left upper extremity  -BP     Left Upper Extremity (Weight-bearing Status) non weight-bearing (NWB)  -BP               User Key  (r) = Recorded By, (t) = Taken By, (c) = Cosigned By      Initials Name Provider Type    BP Maxwell Steinberg, PT Physical Therapist                   Obj/Interventions       Row Name 10/19/24 0909          Range of Motion Comprehensive    Comment, General Range of Motion B LE AROM WFL  -BP       Row Name 10/19/24 0909          Strength Comprehensive (MMT)    Comment, General Manual Muscle Testing (MMT) Assessment B LE strength functional  -BP       Row Name 10/19/24 0909          Balance    Comment, Balance sitting balance-independnet. standing balance-independent.  -BP               User Key  (r) = Recorded By, (t) = Taken By, (c) = Cosigned By      Initials Name Provider Type    BP Maxwell Steinberg, PT Physical Therapist                   Goals/Plan    No documentation.                  Clinical Impression       Row Name 10/19/24 0910          Pain    Pretreatment Pain Rating 0/10 - no pain  -BP     Posttreatment Pain Rating 0/10 - no pain  -BP     Pain Side/Orientation left  -BP     Response to Pain Interventions intervention effective per patient report  -BP       Row Name 10/19/24 0910          Plan of Care Review    Plan of Care Reviewed With patient  -BP     Outcome Evaluation PT Evaluation Complete: patient performs supine to sit transfer with modified independence, sit to/from stand transfers with independence, and gait x 50 feet with independence. Patient demonstrates  good gait mechanics, good gait speed, and no loss of balance. Patient has no concerns for return home and will have assist as needed. No inpatient skilled PT needs at this time, anticipate discharge home today.  -BP       Row Name 10/19/24 0910          Therapy Assessment/Plan (PT)    Criteria for Skilled Interventions Met (PT) no problems identified which require skilled intervention  -BP     Therapy Frequency (PT) evaluation only  -BP       Row Name 10/19/24 0910          Positioning and Restraints    In Chair notified nsg;sitting;call light within reach;encouraged to call for assist  -BP               User Key  (r) = Recorded By, (t) = Taken By, (c) = Cosigned By      Initials Name Provider Type    Maxwell Lopes, PT Physical Therapist                   Outcome Measures       Row Name 10/19/24 0915          How much help from another person do you currently need...    Turning from your back to your side while in flat bed without using bedrails? 4  -BP     Moving from lying on back to sitting on the side of a flat bed without bedrails? 4  -BP     Moving to and from a bed to a chair (including a wheelchair)? 4  -BP     Standing up from a chair using your arms (e.g., wheelchair, bedside chair)? 4  -BP     Climbing 3-5 steps with a railing? 4  -BP     To walk in hospital room? 4  -BP     AM-PAC 6 Clicks Score (PT) 24  -BP     Highest Level of Mobility Goal 8 --> Walked 250 feet or more  -BP       Row Name 10/19/24 0915 10/19/24 0859       Functional Assessment    Outcome Measure Options AM-PAC 6 Clicks Basic Mobility (PT)  -BP AM-PAC 6 Clicks Daily Activity (OT)  -EN              User Key  (r) = Recorded By, (t) = Taken By, (c) = Cosigned By      Initials Name Provider Type    Annette Hdz OTR Occupational Therapist    Maxwell Lopes, PT Physical Therapist                  Physical Therapy Education       Title: PT OT SLP Therapies (Resolved)       Topic: Physical Therapy (Resolved)        Point: Mobility training (Resolved)       Learning Progress Summary            Patient Acceptance, E,TB, VU by  at 10/19/2024 0915                      Point: Precautions (Resolved)       Learning Progress Summary            Patient Acceptance, E,TB, VU by  at 10/19/2024 0915                                      User Key       Initials Effective Dates Name Provider Type [facility] 06/16/21 -  Maxwell Steinberg, PT Physical Therapist PT                  PT Recommendation and Plan     Outcome Evaluation: PT Evaluation Complete: patient performs supine to sit transfer with modified independence, sit to/from stand transfers with independence, and gait x 50 feet with independence. Patient demonstrates good gait mechanics, good gait speed, and no loss of balance. Patient has no concerns for return home and will have assist as needed. No inpatient skilled PT needs at this time, anticipate discharge home today.     Time Calculation:   PT Evaluation Complexity  History, PT Evaluation Complexity: 1-2 personal factors and/or comorbidities  Examination of Body Systems (PT Eval Complexity): 1-2 elements  Clinical Presentation (PT Evaluation Complexity): stable  Clinical Decision Making (PT Evaluation Complexity): low complexity  Overall Complexity (PT Evaluation Complexity): low complexity     PT Charges       Row Name 10/19/24 0916             Time Calculation    Start Time 0800  -BP      Stop Time 0815  -BP      Time Calculation (min) 15 min  -BP      PT Received On 10/19/24  -BP                User Key  (r) = Recorded By, (t) = Taken By, (c) = Cosigned By      Initials Name Provider Type     Maxwell Steinberg, PT Physical Therapist                  Therapy Charges for Today       Code Description Service Date Service Provider Modifiers Qty    [id number] HC PT EVAL LOW COMPLEXITY 1 10/19/2024 Maxwell Steinberg, PT GP 1            PT G-Codes  Outcome Measure Options: AM-PAC 6 Clicks Basic Mobility (PT)  AM-PAC 6  Clicks Score (PT): 24  AM-PAC 6 Clicks Score (OT): 22    PT Discharge Summary  Anticipated Discharge Disposition (PT): home with assist  Reason for Discharge: Discharge from facility  Discharge Destination: Home with assist    Maxwell Steinberg, PT  10/19/2024

## 2024-10-19 NOTE — PLAN OF CARE
Goal Outcome Evaluation:              Outcome Evaluation: Orif of left clavical. Sling in place good radial pulse.No c/o pain

## 2024-10-19 NOTE — PLAN OF CARE
Goal Outcome Evaluation:  Plan of Care Reviewed With: patient           Outcome Evaluation: OT evaluation completed. Patient performed bed mobility with modified independence and sit to stand and functional mobility without a device independently. Patient educated on finger/wrist ROM and edema management. Patient also educated on compensatory strategies for ADLs. Patient reports he has assist at home as needed. No skilled OT needs at this time.    Anticipated Discharge Disposition (OT): home

## 2024-10-19 NOTE — PLAN OF CARE
Goal Outcome Evaluation:              Outcome Evaluation: Pt remains in sling, neuro checks as ordered, pt is tolerating diet well, pt was given PRN pain meds. Pt has no other complaints, pt's vitals remain stable at this time.

## 2024-10-19 NOTE — DISCHARGE SUMMARY
Orthopedic Discharge Summary      Patient: Wang Madden      YOB: 1995    Medical Record Number: [id number]    Attending Physician: Luis Gaines MD  Consulting Physician(s):   Date of Admission: 10/18/2024  6:54 AM  Date of Discharge: 10/19/2024        Displaced fracture of shaft of left clavicle, initial encounter for closed fracture    Closed displaced fracture of shaft of left clavicle     Status Post: Open reduction internal fixation clavicle fracture, left      No Known Allergies    Current Medications:     Discharge Medications        New Medications        Instructions Start Date   docusate sodium 100 MG capsule  Commonly known as: Colace   100 mg, Oral, 2 Times Daily PRN      naloxone 4 MG/0.1ML nasal spray  Commonly known as: NARCAN   Call 911. Don't prime. [place] in 1 nostril for overdose. Repeat in 2-3 minutes in other nostril if no or minimal breathing/responsiveness.      ondansetron ODT 4 MG disintegrating tablet  Commonly known as: ZOFRAN-ODT   4 mg, Oral, Every 8 Hours PRN      oxyCODONE-acetaminophen 7.5-325 MG per tablet  Commonly known as: PERCOCET   1 tablet, Oral, Every 4 Hours PRN             Stop These Medications      acetaminophen 650 MG 8 hr tablet  Commonly known as: Tylenol 8 Hour     ibuprofen 600 MG tablet  Commonly known as: ADVIL,MOTRIN     lidocaine 5 %  Commonly known as: LIDODERM     oxyCODONE 5 MG immediate release tablet  Commonly known as: Roxicodone                  History reviewed. No pertinent past medical history.  History reviewed. No pertinent surgical history.  Social History     Occupational History    Not on file   Tobacco Use    Smoking status: Never     Passive exposure: Never    Smokeless tobacco: Never   Vaping Use    Vaping status: Never Used   Substance and Sexual Activity    Alcohol use: Yes     Alcohol/week: 1.0 standard drink of alcohol     Types: 1 Cans of beer per week    Drug use: Never    Sexual activity: Defer      Social  History     Social History Narrative    Not on file     History reviewed. No pertinent family history.      Physical Exam: 29 y.o. male  General Appearance:    Alert, cooperative, in no acute distress                      Vitals:    10/18/24 2030 10/18/24 2354 10/19/24 0404 10/19/24 0732   BP:  127/63 116/67 136/77   BP Location:  Right arm Right arm Right arm   Patient Position:  Lying Lying Lying   Pulse: 97 109 92 89   Resp: 20 18 18 18   Temp:  97 °F (36.1 °C) 97 °F (36.1 °C) 97.9 °F (36.6 °C)   TempSrc:  Temporal Temporal Tympanic   SpO2: 93% 94% 95% 94%   Weight:       Height:            Head:    Normocephalic, without obvious abnormality, atraumatic   Eyes:            Lids and lashes normal, conjunctivae and sclerae normal, no   icterus, no pallor, corneas clear, PERRLA   Ears:    Ears appear intact with no abnormalities noted   Throat:   No oral lesions, no thrush, oral mucosa moist   Neck:   No adenopathy, supple, trachea midline, no thyromegaly, no    carotid bruit, no JVD   Back:     No kyphosis present, no scoliosis present, no skin lesions,       erythema or scars, no tenderness to percussion or                   palpation,   range of motion normal   Lungs:     Clear to auscultation,respirations regular, even and                   unlabored    Heart:    Regular rhythm and normal rate, normal S1 and S2, no            murmur, no gallop, no rub, no click   Chest Wall:    No abnormalities observed   Abdomen:     Normal bowel sounds, no masses, no organomegaly, soft        non-tender, non-distended, no guarding, no rebound                 tenderness   Rectal:     Deferred   Extremities:   Incision intact without signs or symptoms of infection.               Neurovascular status remains intact to operative extremity.      Moves all extremities well, no edema, no cyanosis, no              redness   Pulses:   Pulses palpable and equal bilaterally   Skin:   No bleeding, bruising or rash   Lymph nodes:   No  palpable adenopathy   Neurologic:   Cranial nerves 2 - 12 grossly intact, sensation intact, DTR        present and equal bilaterally           Hospital Course:  29 y.o. male admitted to [facility] to services of Luis Gaines MD with Displaced fracture of shaft of left clavicle, initial encounter for closed fracture [S42.022A] on 10/18/2024 and underwent No admission procedures for hospital encounter.  Per Luis Gaines MD. Antibiotic and VTE prophylaxis were per SCIP protocols. Post-operatively the patient transferred to the post-operative floor where the patient underwent mobilization therapy that included active as well as passive ROM exercises. Opioids were titrated to achieve appropriate pain management to allow for participation in mobilization exercises. Vital signs are now stable. The incision is intact without signs or symptoms of infection. Operative extremity neurovascular status remains intact.   Appropriate education re: incision care, activity levels, medications, and follow up visits was completed and all questions were answered. The patient is now deemed stable for discharge to Home.      DIAGNOSTIC TESTS:     Admission on 10/18/2024   Component Date Value Ref Range Status    Protime 10/18/2024 12.6  12.1 - 15.0 Seconds Final    INR 10/18/2024 0.91  0.90 - 1.10 Final    PTT 10/18/2024 27.3  24.3 - 38.1 seconds Final    WBC 10/18/2024 6.27  3.40 - 10.80 10*3/mm3 Final    RBC 10/18/2024 5.28  4.14 - 5.80 10*6/mm3 Final    Hemoglobin 10/18/2024 15.6  13.0 - 17.7 g/dL Final    Hematocrit 10/18/2024 45.3  37.5 - 51.0 % Final    MCV 10/18/2024 85.8  79.0 - 97.0 fL Final    MCH 10/18/2024 29.5  26.6 - 33.0 pg Final    MCHC 10/18/2024 34.4  31.5 - 35.7 g/dL Final    RDW 10/18/2024 12.5  12.3 - 15.4 % Final    RDW-SD 10/18/2024 39.2  37.0 - 54.0 fl Final    MPV 10/18/2024 9.9  6.0 - 12.0 fL Final    Platelets 10/18/2024 248  140 - 450 10*3/mm3 Final    Neutrophil % 10/18/2024 63.0  42.7 - 76.0  % Final    Lymphocyte % 10/18/2024 26.6  19.6 - 45.3 % Final    Monocyte % 10/18/2024 6.7  5.0 - 12.0 % Final    Eosinophil % 10/18/2024 3.0  0.3 - 6.2 % Final    Basophil % 10/18/2024 0.5  0.0 - 1.5 % Final    Immature Grans % 10/18/2024 0.2  0.0 - 0.5 % Final    Neutrophils, Absolute 10/18/2024 3.95  1.70 - 7.00 10*3/mm3 Final    Lymphocytes, Absolute 10/18/2024 1.67  0.70 - 3.10 10*3/mm3 Final    Monocytes, Absolute 10/18/2024 0.42  0.10 - 0.90 10*3/mm3 Final    Eosinophils, Absolute 10/18/2024 0.19  0.00 - 0.40 10*3/mm3 Final    Basophils, Absolute 10/18/2024 0.03  0.00 - 0.20 10*3/mm3 Final    Immature Grans, Absolute 10/18/2024 0.01  0.00 - 0.05 10*3/mm3 Final    nRBC 10/18/2024 0.0  0.0 - 0.2 /100 WBC Final    ABO Type 10/18/2024 O   Final    RH type 10/18/2024 Positive   Final    Antibody Screen 10/18/2024 Negative   Final    T&S Expiration Date 10/18/2024 11/1/2024 11:59:00 PM   Final    Glucose 10/19/2024 166 (H)  65 - 99 mg/dL Final    BUN 10/19/2024 19  6 - 20 mg/dL Final    Creatinine 10/19/2024 0.91  0.76 - 1.27 mg/dL Final    Sodium 10/19/2024 136  136 - 145 mmol/L Final    Potassium 10/19/2024 4.0  3.5 - 5.2 mmol/L Final    Chloride 10/19/2024 103  98 - 107 mmol/L Final    CO2 10/19/2024 20.1 (L)  22.0 - 29.0 mmol/L Final    Calcium 10/19/2024 9.1  8.6 - 10.5 mg/dL Final    BUN/Creatinine Ratio 10/19/2024 20.9  7.0 - 25.0 Final    Anion Gap 10/19/2024 12.9  5.0 - 15.0 mmol/L Final    eGFR 10/19/2024 116.3  >60.0 mL/min/1.73 Final    Hemoglobin 10/19/2024 15.7  13.0 - 17.7 g/dL Final    Hematocrit 10/19/2024 45.7  37.5 - 51.0 % Final       No results found.    Discharge and Follow up Instructions:   Sling to be continued left upper extremity, finger range of motion wrist range of motion  Follow-up Ortho office 1 week      Date: 10/19/2024    MAEGAN Mejia

## 2024-10-19 NOTE — CASE MANAGEMENT/SOCIAL WORK
Case Management Discharge Note      Final Note: Home    Provided Post Acute Provider List?: N/A  Provided Post Acute Provider Quality & Resource List?: N/A    Selected Continued Care - Admitted Since 10/18/2024       Destination    No services have been selected for the patient.                Durable Medical Equipment    No services have been selected for the patient.                Dialysis/Infusion    No services have been selected for the patient.                Home Medical Care    No services have been selected for the patient.                Therapy    No services have been selected for the patient.                Community Resources    No services have been selected for the patient.                Community & DME    No services have been selected for the patient.                         Final Discharge Disposition Code: 01 - home or self-care

## 2024-10-19 NOTE — DISCHARGE INSTR - APPOINTMENTS
Pt will receive a call from Elsa Lovell's office about a follow up appointment.      [phone]    Pt will be given a list of PCP. Pt will need to call and get an appointment scheduled.

## 2024-10-19 NOTE — PLAN OF CARE
Goal Outcome Evaluation:  Plan of Care Reviewed With: patient           Outcome Evaluation: PT Evaluation Complete: patient performs supine to sit transfer with modified independence, sit to/from stand transfers with independence, and gait x 50 feet with independence. Patient demonstrates good gait mechanics, good gait speed, and no loss of balance. Patient has no concerns for return home and will have assist as needed. No inpatient skilled PT needs at this time, anticipate discharge home today.    Anticipated Discharge Disposition (PT): home with assist

## 2024-10-19 NOTE — THERAPY DISCHARGE NOTE
Acute Care - Occupational Therapy Discharge   [facility] Mari    Patient Name: Wang Madden  : 1995    MRN: [id number]                              Today's Date: 10/19/2024       Admit Date: 10/18/2024    Visit Dx:     ICD-10-CM ICD-9-CM   1. Status post open reduction and internal fixation (ORIF) of fracture  Z98.890 V45.89    Z87.81 V15.51   2. Closed displaced fracture of shaft of left clavicle, initial encounter  S42.022A 810.02     Patient Active Problem List   Diagnosis    Displaced fracture of shaft of left clavicle, initial encounter for closed fracture    Closed displaced fracture of shaft of left clavicle     History reviewed. No pertinent past medical history.  History reviewed. No pertinent surgical history.   General Information       Row Name 10/19/24 0849          OT Time and Intention    Subjective Information no complaints  -EN     Document Type discharge evaluation/summary  -EN     Mode of Treatment occupational therapy  -EN     Patient Effort good  -EN       Row Name 10/19/24 0849          General Information    Patient Profile Reviewed other (see comments)  -EN     Prior Level of Function independent:;all household mobility;ADL's  -EN     Existing Precautions/Restrictions --  sling on at all times, hand and wrist ROM, edema control  -EN       Row Name 10/19/24 0849          Living Environment    People in Home sibling(s)  -EN       Row Name 10/19/24 0849          Cognition    Orientation Status (Cognition) other (see comments)   via Ipad. Patient reports he has assist at home as needed.  -EN               User Key  (r) = Recorded By, (t) = Taken By, (c) = Cosigned By      Initials Name Provider Type    Annette Hdz OTR Occupational Therapist                   Mobility/ADL's       Row Name 10/19/24 0855          Bed Mobility    Bed Mobility supine-sit  -EN     Supine-Sit [place] (Bed Mobility) modified independence  -EN     Bed Mobility, Safety Issues  decreased use of arms for pushing/pulling  -EN     Assistive Device (Bed Mobility) head of bed elevated  -EN       Row Name 10/19/24 0855          Transfers    Transfers sit-stand transfer  -EN       Row Name 10/19/24 0855          Sit-Stand Transfer    Sit-Stand [place] (Transfers) independent  -EN       Row Name 10/19/24 0855          Functional Mobility    Functional Mobility- Ind. Level independent  -EN     Functional Mobility- Device other (see comments)  no device  -EN     Functional Mobility-Distance (Feet) 50  -EN     Functional Mobility-Maintain WBing able to maintain weight bearing status  -EN       Row Name 10/19/24 0855          Activities of Daily Living    BADL Assessment/Intervention upper body dressing  -EN       Row Name 10/19/24 0855          Mobility    Extremity Weight-bearing Status left upper extremity  -EN     Left Upper Extremity (Weight-bearing Status) non weight-bearing (NWB)  -EN       Row Name 10/19/24 0855          Upper Body Dressing Assessment/Training    Comment, (Upper Body Dressing) educated on one handed dressing and bathing techniques  -EN               User Key  (r) = Recorded By, (t) = Taken By, (c) = Cosigned By      Initials Name Provider Type    Annette Hdz OTR Occupational Therapist                   Obj/Interventions       Row Name 10/19/24 0856          Range of Motion Comprehensive    Comment, General Range of Motion R UE WFL (right hand dominant). left hand/wrist WFL, shoulder deferred  -EN       Row Name 10/19/24 0856          Strength Comprehensive (MMT)    Comment, General Manual Muscle Testing (MMT) Assessment R UE WFL  -EN       Row Name 10/19/24 0856          Balance    Comment, Balance ind with sitting and standing balance  -EN               User Key  (r) = Recorded By, (t) = Taken By, (c) = Cosigned By      Initials Name Provider Type    Annette Hdz OTR Occupational Therapist                   Goals/Plan    No documentation.                   Clinical Impression       Row Name 10/19/24 0857          Pain Assessment    Pretreatment Pain Rating 0/10 - no pain  -EN     Posttreatment Pain Rating 0/10 - no pain  -EN       Row Name 10/19/24 0857          Plan of Care Review    Plan of Care Reviewed With patient  -EN     Outcome Evaluation OT evaluation completed. Patient performed bed mobility with modified independence and sit to stand and functional mobility without a device independently. Patient educated on finger/wrist ROM and edema management. Patient also educated on compensatory strategies for ADLs. Patient reports he has assist at home as needed. No skilled OT needs at this time.  -EN       Row Name 10/19/24 0857          Therapy Assessment/Plan (OT)    Rehab Potential (OT) good  -EN     Criteria for Skilled Therapeutic Interventions Met (OT) no problems identified which require skilled intervention  -EN     Therapy Frequency (OT) evaluation only  -EN       Row Name 10/19/24 0857          Therapy Plan Review/Discharge Plan (OT)    Anticipated Discharge Disposition (OT) home  -EN       Row Name 10/19/24 0857          Positioning and Restraints    Pre-Treatment Position in bed  -EN     Post Treatment Position chair  -EN     In Chair sitting;call light within reach  -EN               User Key  (r) = Recorded By, (t) = Taken By, (c) = Cosigned By      Initials Name Provider Type    Annette Hdz, OTR Occupational Therapist                   Outcome Measures       Row Name 10/19/24 0859          How much help from another is currently needed...    Putting on and taking off regular lower body clothing? 3  -EN     Bathing (including washing, rinsing, and drying) 3  -EN     Toileting (which includes using toilet bed pan or urinal) 4  -EN     Putting on and taking off regular upper body clothing 4  -EN     Taking care of personal grooming (such as brushing teeth) 4  -EN     Eating meals 4  -EN     AM-PAC 6 Clicks Score (OT) 22  -EN        Row Name 10/19/24 0859          Functional Assessment    Outcome Measure Options AM-PAC 6 Clicks Daily Activity (OT)  -EN               User Key  (r) = Recorded By, (t) = Taken By, (c) = Cosigned By      Initials Name Provider Type    EN Annette Martinez OTR Occupational Therapist                  Occupational Therapy Education       Title: PT OT SLP Therapies (Done)       Topic: Occupational Therapy (Done)       Point: ADL training (Done)       Description:   Instruct learner(s) on proper safety adaptation and remediation techniques during self care or transfers.   Instruct in proper use of assistive devices.                  Learning Progress Summary            Patient Acceptance, E, VU by EN at 10/19/2024 0900    Comment: one handed ADL strategies, hand/wrist AROM, edema management                      Point: Home exercise program (Done)       Description:   Instruct learner(s) on appropriate technique for monitoring, assisting and/or progressing therapeutic exercises/activities.                  Learning Progress Summary            Patient Acceptance, E, VU by EN at 10/19/2024 0900    Comment: one handed ADL strategies, hand/wrist AROM, edema management                      Point: Precautions (Done)       Description:   Instruct learner(s) on prescribed precautions during self-care and functional transfers.                  Learning Progress Summary            Patient Acceptance, E, VU by EN at 10/19/2024 0900    Comment: one handed ADL strategies, hand/wrist AROM, edema management                                      User Key       Initials Effective Dates Name Provider Type Discipline    EN 06/16/21 -  Annette Martinez OTR Occupational Therapist OT                  OT Recommendation and Plan  Therapy Frequency (OT): evaluation only  Plan of Care Review  Plan of Care Reviewed With: patient  Outcome Evaluation: OT evaluation completed. Patient performed bed mobility with modified independence and sit  to stand and functional mobility without a device independently. Patient educated on finger/wrist ROM and edema management. Patient also educated on compensatory strategies for ADLs. Patient reports he has assist at home as needed. No skilled OT needs at this time.     Time Calculation:   Evaluation Complexity (OT)  Review Occupational Profile/Medical/Therapy History Complexity: brief/low complexity  Assessment, Occupational Performance/Identification of Deficit Complexity: 1-3 performance deficits  Clinical Decision Making Complexity (OT): problem focused assessment/low complexity  Overall Complexity of Evaluation (OT): low complexity     Time Calculation- OT       Row Name 10/19/24 0901             Time Calculation- OT    OT Start Time 0800  -EN      OT Stop Time 0815  -EN      OT Time Calculation (min) 15 min  -EN                User Key  (r) = Recorded By, (t) = Taken By, (c) = Cosigned By      Initials Name Provider Type    Annette Hdz OTR Occupational Therapist                  Therapy Charges for Today       Code Description Service Date Service Provider Modifiers Qty    [id number]  OT EVAL LOW COMPLEXITY 1 10/19/2024 Annette Martinez OTR GO 1               OT Discharge Summary  Anticipated Discharge Disposition (OT): home    RHIANNA Reyes  10/19/2024

## 2024-10-20 ENCOUNTER — READMISSION MANAGEMENT (OUTPATIENT)
Dept: CALL CENTER | Facility: HOSPITAL | Age: 29
End: 2024-10-20

## 2024-10-20 NOTE — OUTREACH NOTE
Prep Survey      Flowsheet Row Responses   [facility] facility patient discharged from? [place]   Is LACE score < 7 ? Yes   Eligibility Readm Mgmt   Discharge diagnosis Displaced fracture of shaft of left clavicle,  Open reduction internal fixation clavicle fracture and all associated procedures   Does the patient have one of the following disease processes/diagnoses(primary or secondary)? General Surgery   Does the patient have Home health ordered? No   Is there a DME ordered? No   Prep survey completed? Yes            Katherine COBURN - Registered Nurse           independent

## 2024-10-25 ENCOUNTER — OFFICE VISIT (OUTPATIENT)
Dept: ORTHOPEDIC SURGERY | Facility: CLINIC | Age: 29
End: 2024-10-25
Payer: OTHER MISCELLANEOUS

## 2024-10-25 VITALS — BODY MASS INDEX: 27.16 KG/M2 | HEIGHT: 65 IN | WEIGHT: 163 LBS

## 2024-10-25 DIAGNOSIS — Z87.81 STATUS POST OPEN REDUCTION AND INTERNAL FIXATION (ORIF) OF FRACTURE: ICD-10-CM

## 2024-10-25 DIAGNOSIS — Z98.890 STATUS POST OPEN REDUCTION AND INTERNAL FIXATION (ORIF) OF FRACTURE: ICD-10-CM

## 2024-10-25 DIAGNOSIS — S42.022A CLOSED DISPLACED FRACTURE OF SHAFT OF LEFT CLAVICLE, INITIAL ENCOUNTER: Primary | ICD-10-CM

## 2024-10-25 PROCEDURE — 99024 POSTOP FOLLOW-UP VISIT: CPT | Performed by: NURSE PRACTITIONER

## 2024-10-25 NOTE — PROGRESS NOTES
CC: Follow-up status post open reduction internal fixation left clavicle fracture, DOS 10/18/2024    Interval history: Wang Madden return to clinic today approximately 1 week postop left upper extremity, status post open reduction internal fixation left clavicle fracture.  He is continued with sling is continued with finger range of motion.  Denies any residual numbness or tingling at the left upper extremity.  Denies any issues with his incision.  Denies any other concerns present.    Exam:  Left upper extremity examined  Dressing clean dry and intact  Positive sensation light touch all distributions left upper extremity  Flex/extend all digits left hand   strength 5 out of 5  2+ distal radius pulse  Positive deltoid firing  Imagin view x-ray imaging left clavicle ordered and reviewed by myself indication status post ORIF clavicle fracture indicates hardware with good positioning no evidence of any fracture displacement no evidence of hardware loosening no evidence of osteolysis or periprosthetic fracture    Assessment: Status post ORIF left clavicle fracture    Plan:  1.  Discussed plan of care with patient.  We did discuss plan to see him back in clinic in 1 week with repeat x-ray images left clavicle at follow-up.  Continue with elbow motion finger range of motion discussed to avoid increased use of the upper extremity.  Plan for next week's includes to remove the dressing we will proceed with x-ray imaging at follow-up.  Continue with sling until follow-up.   utilized during visit.  He verbalized understanding of all information agrees with plan of care denies any concerns present.

## 2024-11-01 ENCOUNTER — OFFICE VISIT (OUTPATIENT)
Dept: ORTHOPEDIC SURGERY | Facility: CLINIC | Age: 29
End: 2024-11-01
Payer: OTHER MISCELLANEOUS

## 2024-11-01 VITALS — WEIGHT: 163 LBS | BODY MASS INDEX: 27.16 KG/M2 | HEIGHT: 65 IN

## 2024-11-01 DIAGNOSIS — Z98.890 STATUS POST OPEN REDUCTION AND INTERNAL FIXATION (ORIF) OF FRACTURE: ICD-10-CM

## 2024-11-01 DIAGNOSIS — Z87.81 STATUS POST OPEN REDUCTION AND INTERNAL FIXATION (ORIF) OF FRACTURE: ICD-10-CM

## 2024-11-01 DIAGNOSIS — S42.022A CLOSED DISPLACED FRACTURE OF SHAFT OF LEFT CLAVICLE, INITIAL ENCOUNTER: Primary | ICD-10-CM

## 2024-11-01 PROCEDURE — 99024 POSTOP FOLLOW-UP VISIT: CPT | Performed by: NURSE PRACTITIONER

## 2024-11-01 NOTE — PROGRESS NOTES
CC: Follow-up status post open reduction internal fixation left clavicle fracture, DOS 10/18/2024    Interval history: Wang Madden  utilized during visit, returns to clinic today for follow-up of his left shoulder.  He is continued with sling completing gentle pendulum motions.  He is approximately 2 weeks postop.  Denies any issues with his incision denies any residual numbness or tingling at the left upper extremity.  Denies any other concerns present.    Exam:  Left upper extremity examined  Incision and dressing clean dry and intact  Flex/extend all digits left hand   strength 5 out of 5  Brisk cap refill all digits left hand  2+ distal radius pulse  Positive sensation all distributions of the left upper extremity including the palmar and dorsum of the hand including all digits compared to contralateral side  Positive deltoid firing    Imaging:  Left clavicle X-Ray  Indication: Pain  AP Internal and External Rotation views    Findings:  Hardware intact, anatomical alignment, callus appreciated, mild no evidence of fracture displacement no evidence of hardware loosening or osteolysis    Prior studies were available for comparison.    Assessment: Status post ORIF left clavicle fracture    Plan:  1.  Discussed plan of care with patient.  Continue with sling he can complete gentle pendulum exercises and elbow motion out of the sling a few times a day.  The mesh dressing was removed he can shower do recommend antibiotic soap with showering.  Avoid submerging into water.  Will have him follow-up with Dr. Gaines 2 weeks x-ray images to be completed of the left clavicle at that time.  Encouraged to continue with finger motion wrist motion and elbow motion.  All questions answered.

## 2024-11-13 ENCOUNTER — OFFICE VISIT (OUTPATIENT)
Dept: ORTHOPEDIC SURGERY | Facility: CLINIC | Age: 29
End: 2024-11-13
Payer: OTHER MISCELLANEOUS

## 2024-11-13 VITALS — WEIGHT: 163 LBS | HEIGHT: 65 IN | BODY MASS INDEX: 27.16 KG/M2

## 2024-11-13 DIAGNOSIS — S42.022A CLOSED DISPLACED FRACTURE OF SHAFT OF LEFT CLAVICLE, INITIAL ENCOUNTER: Primary | ICD-10-CM

## 2024-11-13 PROCEDURE — 99024 POSTOP FOLLOW-UP VISIT: CPT | Performed by: ORTHOPAEDIC SURGERY

## 2024-11-13 NOTE — PROGRESS NOTES
CC: Status post ORIF left comminuted displaced clavicle fracture 10/18/2024    Interval history: Patient turns clinic today stating overall doing well at this point in time.  Translation today by online .  He denies any fevers chills or sweats.  No issues with his incision.  He has been using his sling for stability.      Exam:  Left shoulder-minimal tenderness over incision site.  Incision well-healed.  No erythema or fluctuance, no subcutaneous fluid collection.  Active forward flexion of shoulder to 90 degrees, active external rotation 30 degrees, passive forward flexion 120 degrees, passive external rotation 40 degrees.  Positive sensation light touch all distributions left hand and upper extremity symmetric to the right.  Brisk up refill all digits, 2+ radial pulse.          Imaging:  AP and superior views of left clavicle from today's visit, ordered and reviewed by me, indication status post open reduction internal fixation comminuted clavicle shaft fracture, compared to prior office x-rays indicate stable alignment of the fracture site with comminuted zones noted, moderate evidence of callus formation at fracture site appreciated.  No evidence of loss of fixation or failure of implant.    Impression: Status post ORIF left comminuted displaced clavicle fracture    Plan:  Will plan for patient to follow-up in 4 weeks with repeat x-rays.  He can discontinue sling and work on range of motion activities at this point in time, no resisted activities yet at this point.  No work at this time.  All questions answered

## 2024-12-11 ENCOUNTER — OFFICE VISIT (OUTPATIENT)
Dept: ORTHOPEDIC SURGERY | Facility: CLINIC | Age: 29
End: 2024-12-11
Payer: OTHER MISCELLANEOUS

## 2024-12-11 DIAGNOSIS — S42.022A CLOSED DISPLACED FRACTURE OF SHAFT OF LEFT CLAVICLE, INITIAL ENCOUNTER: Primary | ICD-10-CM

## 2024-12-11 PROCEDURE — 99024 POSTOP FOLLOW-UP VISIT: CPT | Performed by: ORTHOPAEDIC SURGERY

## 2024-12-11 NOTE — PROGRESS NOTES
CC: Status post ORIF left comminuted displaced clavicle fracture 10/18/2024     Interval history: Patient turns clinic today stating overall doing well at this point in time.  Translation today by online .  Patient states he is doing well at this point in time: Minimal irritation with maximal forward flexion.  Denies numbness or tingling.  No issues with his incision.  He has been working on home exercises out of the sling and tolerating quite well at this time.     Exam:  Left shoulder-minimal tenderness over incision site.  Incision well-healed.  No erythema or fluctuance, no subcutaneous fluid collection.  Active forward flexion 175 degrees, 4 out of 5 strength, active external rotation 55 degrees, 4 out of 5 strength, 4+ out of 5 strength of belly press test with negative bearhug sign.  No increased pain with cross arm testing.  No pain over AC joint.    Imaging:  AP and superior views of left clavicle from today's visit, ordered and reviewed by me, indication status post open reduction internal fixation comminuted clavicle shaft fracture, compared to prior office x-rays indicate stable alignment of the fracture site with comminuted zones noted, good evidence of callus formation at fracture site appreciated.  No evidence of loss of fixation or failure of implant.     Impression: Status post ORIF left comminuted displaced clavicle fracture     Plan:  Patient is making good progress at this point in time, he still is limited with his strength so I would hold him out of any lifting pushing or pulling and does try to keep him off work for another 4 to 6 weeks to allow him to increase his strengthening.  Recommended home exercises for strengthening at this point.  I will see him back in 6 weeks with x-ray of left clavicle-if he is doing well at that point in time I would anticipate return to work without restriction.  He was in agreement had all questions answered

## 2025-01-20 ENCOUNTER — OFFICE VISIT (OUTPATIENT)
Dept: ORTHOPEDIC SURGERY | Facility: CLINIC | Age: 30
End: 2025-01-20
Payer: OTHER MISCELLANEOUS

## 2025-01-20 VITALS — BODY MASS INDEX: 27.16 KG/M2 | HEIGHT: 65 IN | WEIGHT: 163 LBS

## 2025-01-20 DIAGNOSIS — S42.022A CLOSED DISPLACED FRACTURE OF SHAFT OF LEFT CLAVICLE, INITIAL ENCOUNTER: Primary | ICD-10-CM

## 2025-01-20 PROCEDURE — 99212 OFFICE O/P EST SF 10 MIN: CPT | Performed by: ORTHOPAEDIC SURGERY

## 2025-01-20 NOTE — PROGRESS NOTES
"Subjective:     Patient ID: Wang Madden is a 29 y.o. male.    Chief Complaint:  Status post ORIF left comminuted displaced clavicle fracture 10/18/2024   History of Present Illness  History of Present Illness  Randall returns clinic today doing well with his left shoulder, denies any residual numbness tingling or issues with incision.  Online  for visit today to coordinate care.  He feels like he is at 95% of his motion return and his been working with his shoulder and doing well at this point.     Social History     Occupational History    Not on file   Tobacco Use    Smoking status: Never     Passive exposure: Never    Smokeless tobacco: Never   Vaping Use    Vaping status: Never Used   Substance and Sexual Activity    Alcohol use: Yes     Alcohol/week: 1.0 standard drink of alcohol     Types: 1 Cans of beer per week    Drug use: Never    Sexual activity: Defer      No past medical history on file.  Past Surgical History:   Procedure Laterality Date    CLAVICLE OPEN REDUCTION INTERNAL FIXATION Left 10/18/2024    Procedure: Open reduction internal fixation clavicle fracture and all associated procedures;  Surgeon: Luis Gaines MD;  Location: [facility];  Service: Orthopedics;  Laterality: Left;       No family history on file.      Review of Systems        Objective:  Vitals:    01/20/25 1547   Weight: 73.9 kg (163 lb)   Height: 165.1 cm (65\")         01/20/25  1547   Weight: 73.9 kg (163 lb)     Body mass index is 27.12 kg/m².  General: No acute distress.  Resp: normal respiratory effort  Skin: no rashes or wounds; normal turgor  Psych: mood and affect appropriate; recent and remote memory intact          Physical Exam         Left shoulder-no residual tenderness to palpation or percussion over clavicle.  Incision well-healed.  Active forward flexion 180 degrees with 5 out of 5 strength, external rotation 70 degrees 5 out of 5 strength, internal rotation T10, 5 out of 5 strength of belly " press test.  No pain over AC joint.  No pain with crossarm testing.  Imagin view x-rays of left clavicle from today's visit AP and superior views ordered and reviewed by me, indication status post open reduction internal fixation clavicle shaft fracture, compared to prior office x-rays indicates good interval healing and filling of fracture site with callus formation, mild residual region of radiolucency obliquely through the central portion of the fracture with good evidence of periosteal callus formation, no evidence of loss of fixation or failure of implant.    Assessment:        1. Closed displaced fracture of shaft of left clavicle, initial encounter           Plan:     Randall is doing well at this point in time, fracture appears to be well-healed on imaging.  He can return to all activities as tolerated with no limitations.  I told him I would be careful with repetitive overhead lifting as well as any contact activities for another 2 to 3 months.  Follow-up as needed.    Assessment & Plan      Wang Madden was in agreement with plan and had all questions answered.     Orders:  Orders Placed This Encounter   Procedures    XR Clavicle Left       Medications:  No orders of the defined types were placed in this encounter.      Followup:  No follow-ups on file.    Diagnoses and all orders for this visit:    1. Closed displaced fracture of shaft of left clavicle, initial encounter (Primary)  -     XR Clavicle Left                  Dictated utilizing Dragon dictation     Patient or patient representative verbalized consent for the use of Ambient Listening during the visit with  Luis Gaines MD for chart documentation. 2025  16:11 EST

## (undated) DEVICE — BIT DRL QC 2.7X125MM NS

## (undated) DEVICE — ELECTRD NDL EZ CLN STD 2.75IN

## (undated) DEVICE — SCRW MTPHSL S/TAP T8STRDRV 2.7X18MM
Type: IMPLANTABLE DEVICE | Site: ARM | Status: NON-FUNCTIONAL
Removed: 2024-10-18

## (undated) DEVICE — DRSNG SURESITE WNDW 4X4.5

## (undated) DEVICE — SOL IRR H2O BTL 1000ML STRL

## (undated) DEVICE — PCH SURG INVISISHIELD FLD/COL W/DRN/PRT 20X6IN

## (undated) DEVICE — SPNG GZ WOVN 4X4IN 12PLY 10/BX STRL

## (undated) DEVICE — GLV SURG SENSICARE PI PF LF 7 GRN STRL

## (undated) DEVICE — PK BASIC ORTHO 90

## (undated) DEVICE — SPONGE,LAP,18"X18",DLX,XR,ST,5/PK,40/PK: Brand: MEDLINE

## (undated) DEVICE — BIT DRL QC CALIB 2X140X60MM NS

## (undated) DEVICE — GLV SURG SENSICARE PI LF PF 7.0

## (undated) DEVICE — T-MAX DISPOSABLE FACE MASK 8 PER BOX

## (undated) DEVICE — DRAPE,TOWEL,LARGE,INVISISHIELD: Brand: MEDLINE

## (undated) DEVICE — ANTIBACTERIAL UNDYED BRAIDED (POLYGLACTIN 910), SYNTHETIC ABSORBABLE SUTURE: Brand: COATED VICRYL

## (undated) DEVICE — PCH INST SURG INVISISHIELD 2PCKT

## (undated) DEVICE — PAD,NON-ADHERENT,3X8,STERILE,LF,1/PK: Brand: MEDLINE

## (undated) DEVICE — SKIN PREP TRAY W/CHG: Brand: MEDLINE INDUSTRIES, INC.

## (undated) DEVICE — GLV SURG SENSICARE PF POLYISPRN SZ8 LF

## (undated) DEVICE — 3M™ IOBAN™ 2 ANTIMICROBIAL INCISE DRAPE 6640EZ: Brand: IOBAN™ 2

## (undated) DEVICE — DRP C/ARM 41X74IN

## (undated) DEVICE — PREP SOL POVIDONE/IODINE BT 4OZ

## (undated) DEVICE — STOCKINETTE,IMPERVIOUS,12X48,STERILE: Brand: MEDLINE

## (undated) DEVICE — SUT ABS VICRLY/PLS COAT CR 2/0 CP/2/REV/CUT/NDL 8X18IN UD

## (undated) DEVICE — SHEET, ORTHO, SPLIT, STERILE: Brand: MEDLINE

## (undated) DEVICE — DRAPE,U/ SHT,SPLIT,PLAS,STERIL: Brand: MEDLINE

## (undated) DEVICE — SYS CLS SKIN PREMIERPRO EXOFINFUSION 22CM

## (undated) DEVICE — Device

## (undated) DEVICE — FRAZIER SUCTION INSTRUMENT 10 FR W/CONTROL VENT & OBTURATOR: Brand: FRAZIER

## (undated) DEVICE — APPL CHLORAPREP HI/LITE 26ML ORNG

## (undated) DEVICE — BIT DRL QC CALIB 2X110X30MM NS

## (undated) DEVICE — TP CLTH MEDIPORE SFT 4IN 10YD

## (undated) DEVICE — DRP SURG U/DRP INVISISHIELD PA 48X52IN

## (undated) DEVICE — BNDG COBAN S/ADHR WRP LF 6IN 5YD TN

## (undated) DEVICE — SOL ISO/ALC RUB 70PCT 4OZ

## (undated) DEVICE — MAYO STAND COVER: Brand: CONVERTORS